# Patient Record
Sex: FEMALE | HISPANIC OR LATINO | Employment: FULL TIME | URBAN - METROPOLITAN AREA
[De-identification: names, ages, dates, MRNs, and addresses within clinical notes are randomized per-mention and may not be internally consistent; named-entity substitution may affect disease eponyms.]

---
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
## 2019-06-21 ENCOUNTER — OFFICE VISIT (OUTPATIENT)
Dept: URGENT CARE | Facility: PHYSICIAN GROUP | Age: 19
End: 2019-06-21
Payer: COMMERCIAL

## 2019-06-21 VITALS
OXYGEN SATURATION: 97 % | TEMPERATURE: 98.5 F | RESPIRATION RATE: 20 BRPM | DIASTOLIC BLOOD PRESSURE: 72 MMHG | HEART RATE: 65 BPM | WEIGHT: 156 LBS | SYSTOLIC BLOOD PRESSURE: 110 MMHG | BODY MASS INDEX: 25.96 KG/M2

## 2019-06-21 DIAGNOSIS — A09 TRAVELERS' DIARRHEA: ICD-10-CM

## 2019-06-21 PROCEDURE — 99203 OFFICE O/P NEW LOW 30 MIN: CPT | Performed by: NURSE PRACTITIONER

## 2019-06-21 RX ORDER — CIPROFLOXACIN 500 MG/1
500 TABLET, FILM COATED ORAL 2 TIMES DAILY
Qty: 6 TAB | Refills: 0 | Status: SHIPPED | OUTPATIENT
Start: 2019-06-21

## 2019-06-21 ASSESSMENT — ENCOUNTER SYMPTOMS
MYALGIAS: 0
NAUSEA: 1
CHILLS: 1
DIZZINESS: 1
BLOOD IN STOOL: 0
FEVER: 0
HEADACHES: 0
DIARRHEA: 1
ABDOMINAL PAIN: 1

## 2019-06-21 NOTE — PROGRESS NOTES
"Subjective:      Adrian Riley is a 19 y.o. female who presents with Nausea (went to [place] now having diarrhea,vomiting,nausea, x 1 week)            HPI New. 19 year old female with one week history of diarrhea, bloating and some nausea following a trip to [place]. She denies any fever, myalgia, headache or vomiting. She denies any blood or pus in her stool. States everything \"goes through her\". She has not taken any medication for this issue.  Patient has no known allergies.  No current outpatient prescriptions on file prior to visit.     No current facility-administered medications on file prior to visit.      Social History     Social History   • Marital status: Single     Spouse name: N/A   • Number of children: N/A   • Years of education: N/A     Occupational History   • Not on file.     Social History Main Topics   • Smoking status: Never Smoker   • Smokeless tobacco: Never Used   • Alcohol use Not on file   • Drug use: Unknown   • Sexual activity: Not on file     Other Topics Concern   • Not on file     Social History Narrative   • No narrative on file     family history is not on file.      Review of Systems   Constitutional: Positive for chills. Negative for fever.   Gastrointestinal: Positive for abdominal pain, diarrhea and nausea. Negative for blood in stool and melena.   Genitourinary: Negative.    Musculoskeletal: Negative for myalgias.   Neurological: Positive for dizziness. Negative for headaches.          Objective:     /72 (BP Location: Right arm, Patient Position: Sitting, BP Cuff Size: Adult)   Pulse 65   Temp 36.9 °C (98.5 °F) (Temporal)   Resp 20   Wt 70.8 kg (156 lb)   SpO2 97%   BMI 25.96 kg/m²      Physical Exam   Constitutional: She is oriented to person, place, and time. She appears well-developed and well-nourished. No distress.   Cardiovascular: Normal rate, regular rhythm and normal heart sounds.    No murmur heard.  Pulmonary/Chest: Effort normal and breath sounds normal. No " respiratory distress.   Abdominal: Soft. Bowel sounds are increased. There is no tenderness. There is no CVA tenderness.   Musculoskeletal: Normal range of motion.   Moves all 4 extremities normally   Neurological: She is alert and oriented to person, place, and time.   Skin: Skin is warm and dry.   Psychiatric: She has a normal mood and affect. Her behavior is normal. Thought content normal.   Vitals reviewed.              Assessment/Plan:     1. Travelers' diarrhea  ciprofloxacin (CIPRO) 500 MG Tab     Differential diagnosis, natural history, supportive care, and indications for immediate follow-up discussed at length.

## 2019-06-21 NOTE — LETTER
June 21, 2019        Adamsariana Osvaldo  [address]  [place] [address]        Adrian was seen in our clinic today and she is excused from work.  If you have any questions or concerns, please don't hesitate to call.        Sincerely,        ROBERT Pratt.PANNABELLE.    Electronically Signed